# Patient Record
Sex: MALE | Race: WHITE | NOT HISPANIC OR LATINO | Employment: OTHER | URBAN - METROPOLITAN AREA
[De-identification: names, ages, dates, MRNs, and addresses within clinical notes are randomized per-mention and may not be internally consistent; named-entity substitution may affect disease eponyms.]

---

## 2024-03-09 PROBLEM — H61.23 BILATERAL IMPACTED CERUMEN: Status: ACTIVE | Noted: 2024-03-09

## 2024-03-09 PROBLEM — H90.3 SENSORINEURAL HEARING LOSS (SNHL) OF BOTH EARS: Status: ACTIVE | Noted: 2024-03-09

## 2024-03-09 PROBLEM — H61.303 STENOSIS OF BOTH EXTERNAL AUDITORY CANALS: Status: ACTIVE | Noted: 2024-03-09

## 2024-03-09 NOTE — PROGRESS NOTES
Subjective   Patient ID: Obdulio Pineda is a 68 y.o. male who presents for No chief complaint on file..  HPI  This 68-year-old male is being seen back in the office for a recheck primarily of his ears.  He was last seen in September 2023.  He has a history of ear canal stenosis along with ceruminous and buildup causing difficulties with hearing.  There is an underlying sensorineural hearing loss noted in both ears as well.  No active problems regarding pain or discharge from the ears are noted.  He has no other head neck related symptoms or problems at this time.       Review of Systems  A 12 point ROS has been reviewed and are negative for complaint except what is stated in the history of present illness and/or past medical history as noted in the EMR    Past Medical History:   Diagnosis Date    Personal history of malignant neoplasm of prostate     History of malignant neoplasm of prostate    Personal history of other diseases of the circulatory system     History of hypertension    Personal history of other diseases of the digestive system     History of esophageal reflux    Personal history of other diseases of urinary system     History of kidney disease    Personal history of other malignant neoplasm of kidney     History of malignant neoplasm of kidney    Unspecified convulsions (CMS/HCC)     Seizures        No current outpatient medications on file.   Objective   Physical Exam  EXAMINATION:     GENERAL LINDA.EARANCE: Alert, obese male in no acute distress, normal pitch and clarity of voice, cooperative.     HEAD/FACE: Normocephalic, atraumatic, normal facial movements and strength, no no tenderness to palpation, no lesions noted.     SKIN: Normal turgor, no raised or ulcerative lesions, warm and dry to palpation.     EYES: Extraocular motions intact, no nystagmus noted, pupils equal and reactive to light and accommodation, no conjunctivitis.     EARS: Both ears--both external ears are within normal limits  with stenosis of the canals noted.     NOSE: No external skin lesions are noted, nares are patent, septum is intact, sinuses are nontender to palpation bilaterally, no intranasal lesions or inflammation is noted, nasal valve is normal.     OROPHARYNX/ORAL CAVITY: Oropharynx is not inflamed and is without lesions, mucosa of the oral cavity is intact and without lesions, tongue is midline and mobile, no acute dental disease is noted, TMJs are mobile     NECK: No lymphadenopathy is palpated, carotid pulses are intact, neck is supple with full range of motion, no thyroid abnormalities are noted, trachea is midline, no neck masses are palpated.     LYMPHATICS: No cervical adenopathy or supraclavicular adenopathy is palpated.     NEUROLOGIC/PSYCH; alert and oriented, cranial nerves are grossly intact, gait is without falling, no motor deficits are noted.    Patient ID: Obdulio Pineda is a 68 y.o. male.    Ear cerumen removal    Date/Time: 3/13/2024 9:08 AM    Performed by: Nathan Olmos DMD, MD  Authorized by: Nathan Olmos DMD, MD    Consent:     Consent obtained:  Verbal    Consent given by:  Patient    Risks discussed:  Pain and incomplete removal    Alternatives discussed:  No treatment and alternative treatment  Universal protocol:     Procedure explained and questions answered to patient or proxy's satisfaction: yes      Imaging studies available: no      Required blood products, implants, devices, and special equipment available: no      Patient identity confirmed:  Verbally with patient  Procedure details:     Location:  L ear and R ear    Procedure type: curette      Procedure type comment:  Or suction    Procedure outcomes: cerumen removed    Post-procedure details:     Inspection:  No bleeding and ear canal clear    Hearing quality:  Improved    Procedure completion:  Tolerated well, no immediate complications  Comments:      CERUMEN REMOVAL    Consent:   the planned procedure is discussed  including possible risks, benefits and alternative treatments reviewed. Verbal consent is obtained.    Indications:  obstructive cerumen is noted affecting hearing and causing discomfort.    Procedure: The ears are examined microscopically and obstructive cerumen is removed with a wax loop,  and forceps.    Findings: Cerumen and epithelial debris obstructs both canals left much greater than right.  tympanic membranes are intact and noninflamed once visualized and no infections are noted.  The cerumen was removed with a combination of curette and forceps.  Ear canals are stenotic.    Post procedure: The patient tolerated the procedure well without complications.      His audiogram today in both ears continues to show a mild to moderate sensorineural hearing loss with very mild asymmetry in the high tone area.  Discrimination scores are 100% bilaterally at 70 dB.  Tympanograms are normal.  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 Do to the history of cerumen impactions, avoidance of Q tip use and water contamination is advised. Periodic check ups in the office or with the PCP are advised and if recurrent obstructions are noted a scheduled cleaning schedule will be maintained. Ear pain, otorhea, changes in hearing should be reported to the office. For some the use of debrox or baby oil can be helpful as long as wayne TM is intact and not perforated.    I discussed the present hearing test findings with the patient. Since the last test there has been no significant change in the hearing of individual frequencies sound. Discrimination ability remains basically unchanged. It would be advised that a  yearly audiogram be done unless symptoms develop in regards to progressive loss, new onset vertigo, or changes regarding tinnitus. Avoidance of loud noise without ear protection is advised. Rehabilitation using hearing aids is advised.    I discussed the physical findings with the patient. The ear canal stenosis does account for the accumulation of cerumen and epithelial debris in the outer third of the ear canal and obstructs the canal causing a decrease in hearing of a conductive nature. Periodic examinations and debridement are needed in order to maximize hearing and decrease risks of canal obstruction and possible postobstructive infections. Avoidance of water in the ears during bathing, avoidance of Q-tip use and periodic check ups for ear debridement are recommended.       Nathan Olmos DMD, MD 03/09/24 4:18 PM

## 2024-03-13 ENCOUNTER — CLINICAL SUPPORT (OUTPATIENT)
Dept: AUDIOLOGY | Facility: CLINIC | Age: 69
End: 2024-03-13
Payer: MEDICARE

## 2024-03-13 ENCOUNTER — OFFICE VISIT (OUTPATIENT)
Dept: OTOLARYNGOLOGY | Facility: CLINIC | Age: 69
End: 2024-03-13
Payer: MEDICARE

## 2024-03-13 DIAGNOSIS — H90.3 SENSORINEURAL HEARING LOSS (SNHL) OF BOTH EARS: ICD-10-CM

## 2024-03-13 DIAGNOSIS — H61.303 STENOSIS OF BOTH EXTERNAL AUDITORY CANALS: Primary | ICD-10-CM

## 2024-03-13 DIAGNOSIS — H61.23 BILATERAL IMPACTED CERUMEN: ICD-10-CM

## 2024-03-13 DIAGNOSIS — H93.13 TINNITUS OF BOTH EARS: ICD-10-CM

## 2024-03-13 DIAGNOSIS — H90.3 SENSORINEURAL HEARING LOSS (SNHL) OF BOTH EARS: Primary | ICD-10-CM

## 2024-03-13 PROCEDURE — 69210 REMOVE IMPACTED EAR WAX UNI: CPT | Performed by: OTOLARYNGOLOGY

## 2024-03-13 PROCEDURE — 1160F RVW MEDS BY RX/DR IN RCRD: CPT | Performed by: OTOLARYNGOLOGY

## 2024-03-13 PROCEDURE — 92557 COMPREHENSIVE HEARING TEST: CPT | Performed by: AUDIOLOGIST

## 2024-03-13 PROCEDURE — 1159F MED LIST DOCD IN RCRD: CPT | Performed by: OTOLARYNGOLOGY

## 2024-03-13 PROCEDURE — 92567 TYMPANOMETRY: CPT | Performed by: AUDIOLOGIST

## 2024-03-13 PROCEDURE — 99214 OFFICE O/P EST MOD 30 MIN: CPT | Performed by: OTOLARYNGOLOGY

## 2024-03-13 NOTE — PROGRESS NOTES
"COMPREHENSIVE AUDIOMETRIC EVALUATION      Name:  Obdulio Pineda \"Jeff\"  :  1955  Age:  68 y.o.  Date of Evaluation:  24   Referring Provider:  Dr. Olmos     History:  Mr. Pineda was seen today for an evaluation of hearing.  Patient reported tinnitus and concerns for decreased hearing sensitivity.  Please recall, patient has a known bilateral sensorineural hearing loss, most recently evaluated 10/13/2022, for which he has hearing aids.  Patient reported that he has not been consistently utilizing his hearing aids.  He noted increased difficulty in the presence of background noise. When asked, patient denied otalgia.    See audiometric evaluation at end of this report or scanned under media tab    OTOSCOPY:       Right Ear: Minimal non-occluding cerumen with unremarkable tympanic membrane       Left Ear: Significant though non-occluding cerumen with inability to visualize TM    226 Hz TYMPANOMETRY:       Right Ear: Type Ad: hypercompliant with normal peak pressure and ear canal volume       Left Ear: Type Ad: hypercompliant with normal peak pressure and ear canal volume    AUDIOMETRIC EVALUATION (Inserts):       Right Ear: Normal sloping to Moderate, Sensorineural hearing loss                 Left Ear: Normal sloping to Moderately severe, Sensorineural hearing loss    NOTE: Hearing sensitivity consistent with 10/13/2022 evaluation           Test technique:  Standard Audiometry  Reliability:   good    SPEECH RECOGNITION THRESHOLD:       Right Ear:  30 dBHL in good agreement with PTA       Left Ear:  30 dBHL in good agreement with PTA    WORD RECOGNITION:       Right Ear:  excellent (100%) at elevated presentation level       Left Ear:  excellent (100%) at elevated presentation level    DISCUSSION:   Discussed results and recommendations with patient.  Questions were addressed and the patient was encouraged to contact our department should concerns arise.    RECOMMENDATIONS:  -Recommend patient " re-establish consistent utilization of hearing aids  -Recommend patient return should concerns for changes in hearing sensitivity arise or as medically indicated.     Thanh Esquivel, CCC-A     Appt: 9::00 - 9:30 AM

## 2024-08-31 NOTE — PROGRESS NOTES
"Subjective   Patient ID: Obdulio Pineda \"Jeff\" is a 68 y.o. male who presents for No chief complaint on file..  HPI  This 68-year-old male last seen in the office in March is being seen in follow-up.  He has a history of ear canal stenosis causing cerumen buildup affecting hearing.  He does have a sensorineural hearing loss in both ears unassociated with symptoms of vertigo or instability.  Review of Systems  A 12 point ROS has been reviewed and are negative for complaint except what is stated in the history of present illness and/or past medical history as noted in the EMR  Active Ambulatory Problems     Diagnosis Date Noted    Stenosis of both external auditory canals 03/09/2024    Bilateral impacted cerumen 03/09/2024    Sensorineural hearing loss (SNHL) of both ears 03/09/2024    Tinnitus of both ears 03/13/2024     Resolved Ambulatory Problems     Diagnosis Date Noted    No Resolved Ambulatory Problems     Past Medical History:   Diagnosis Date    Personal history of malignant neoplasm of prostate     Personal history of other diseases of the circulatory system     Personal history of other diseases of the digestive system     Personal history of other diseases of urinary system     Personal history of other malignant neoplasm of kidney     Unspecified convulsions (Multi)        No current outpatient medications on file.     Social History     Tobacco Use    Smoking status: Not on file    Smokeless tobacco: Not on file   Substance Use Topics    Alcohol use: Not on file       Not on File    There were no vitals taken for this visit.    Objective   Physical Exam  GENERAL LINDA.EARANCE: Alert, obese male in no acute distress, normal pitch and clarity of voice, cooperative.     HEAD/FACE: Normocephalic, atraumatic, normal facial movements and strength, no no tenderness to palpation, no lesions noted.     SKIN: Normal turgor, no raised or ulcerative lesions, warm and dry to palpation.     EYES: Extraocular " "motions intact, no nystagmus noted, pupils equal and reactive to light and accommodation, no conjunctivitis.     EARS: Both ears--both external ears are within normal limits with stenosis of the canals noted.  See procedure note     NOSE: No external skin lesions are noted, nares are patent, septum is intact, sinuses are nontender to palpation bilaterally, no intranasal lesions or inflammation is noted, nasal valve is normal.     OROPHARYNX/ORAL CAVITY: Oropharynx is not inflamed and is without lesions, mucosa of the oral cavity is intact and without lesions, tongue is midline and mobile, no acute dental disease is noted, TMJs are mobile     NECK: No lymphadenopathy is palpated, carotid pulses are intact, neck is supple with full range of motion, no thyroid abnormalities are noted, trachea is midline, no neck masses are palpated.     LYMPHATICS: No cervical adenopathy or supraclavicular adenopathy is palpated.     NEUROLOGIC/PSYCH; alert and oriented, cranial nerves are grossly intact, gait is without falling, no motor deficits are noted.    Patient ID: Obdulio Pineda \"Albino" is a 68 y.o. male.    Ear cerumen removal    Date/Time: 9/23/2024 2:01 PM    Performed by: Nathan Olmos DMD, MD  Authorized by: Nathan Olmos DMD, MD    Consent:     Consent obtained:  Verbal    Consent given by:  Patient    Risks discussed:  Pain and incomplete removal    Alternatives discussed:  No treatment and alternative treatment  Universal protocol:     Procedure explained and questions answered to patient or proxy's satisfaction: yes      Imaging studies available: no      Required blood products, implants, devices, and special equipment available: no      Patient identity confirmed:  Verbally with patient  Procedure details:     Location:  L ear and R ear    Procedure type: curette      Procedure type comment:  Or suction    Procedure outcomes: cerumen removed    Post-procedure details:     Inspection:  No bleeding and " ear canal clear    Hearing quality:  Improved    Procedure completion:  Tolerated well, no immediate complications    Assessment/Plan   Problem List Items Addressed This Visit             ICD-10-CM    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  Exam today was negative for any signs of acute inflammation and has had no historical issues regarding changes in hearing or difficulties in other regards of his head neck.  Recheck in 6 months is recommended along with an audiogram to see if there is any further change in his overall hearing capacity.  He has been aware that he can improve his hearing by use of hearing aids if he is noticing more difficulties.  I encouraged him to stay well-hydrated avoid excessive salt in the diet and always contact the office with is any sudden change in hearing ability.  Nathan Olmos DMD, MD 08/31/24 12:07 PM

## 2024-09-05 PROBLEM — K63.1 COLON PERFORATION (MULTI): Status: ACTIVE | Noted: 2019-06-05

## 2024-09-05 PROBLEM — Z99.89 DEPENDENCE ON OTHER ENABLING MACHINES AND DEVICES: Status: ACTIVE | Noted: 2024-09-05

## 2024-09-05 PROBLEM — R56.9 SEIZURE (MULTI): Status: ACTIVE | Noted: 2024-09-05

## 2024-09-05 PROBLEM — Z86.79 HISTORY OF HYPERTENSION: Status: ACTIVE | Noted: 2024-09-05

## 2024-09-05 PROBLEM — E61.7 DEFICIENCY OF MULTIPLE NUTRIENT ELEMENTS: Status: ACTIVE | Noted: 2020-06-03

## 2024-09-05 PROBLEM — C61 PROSTATE CANCER (MULTI): Status: ACTIVE | Noted: 2017-10-09

## 2024-09-05 PROBLEM — G47.33 OBSTRUCTIVE SLEEP APNEA SYNDROME: Status: ACTIVE | Noted: 2019-06-05

## 2024-09-05 PROBLEM — K44.9 HIATAL HERNIA: Status: ACTIVE | Noted: 2020-06-22

## 2024-09-05 PROBLEM — K63.1 PERFORATED BOWEL (MULTI): Status: ACTIVE | Noted: 2019-06-12

## 2024-09-05 PROBLEM — D50.8 IRON DEFICIENCY ANEMIA SECONDARY TO INADEQUATE DIETARY IRON INTAKE: Status: ACTIVE | Noted: 2023-07-10

## 2024-09-05 PROBLEM — H61.23 CERUMINOSIS, BILATERAL: Status: ACTIVE | Noted: 2024-09-05

## 2024-09-05 PROBLEM — E66.01 MORBID OBESITY (MULTI): Status: ACTIVE | Noted: 2020-06-22

## 2024-09-05 PROBLEM — H93.293 AUDITORY DISCRIMINATION IMPAIRMENT, BILATERAL: Status: ACTIVE | Noted: 2024-09-05

## 2024-09-05 RX ORDER — CLONIDINE HYDROCHLORIDE 0.1 MG/1
0.1 TABLET ORAL DAILY
COMMUNITY

## 2024-09-05 RX ORDER — UBIDECARENONE 75 MG
CAPSULE ORAL EVERY 24 HOURS
COMMUNITY

## 2024-09-05 RX ORDER — APIXABAN 5 MG/1
TABLET, FILM COATED ORAL
COMMUNITY

## 2024-09-05 RX ORDER — AMLODIPINE BESYLATE 5 MG/1
5 TABLET ORAL DAILY
COMMUNITY

## 2024-09-05 RX ORDER — DULAGLUTIDE 0.75 MG/.5ML
INJECTION, SOLUTION SUBCUTANEOUS
COMMUNITY
Start: 2023-07-10

## 2024-09-05 RX ORDER — PSEUDOEPHEDRINE HCL 30 MG
TABLET ORAL EVERY 12 HOURS
COMMUNITY

## 2024-09-05 RX ORDER — CHOLECALCIFEROL (VITAMIN D3) 50 MCG
TABLET ORAL
COMMUNITY
Start: 2018-03-16

## 2024-09-05 RX ORDER — TERAZOSIN 10 MG/1
CAPSULE ORAL
COMMUNITY
Start: 2017-01-13

## 2024-09-05 RX ORDER — METFORMIN HYDROCHLORIDE 500 MG/1
TABLET, EXTENDED RELEASE ORAL EVERY 24 HOURS
COMMUNITY
Start: 2023-12-07

## 2024-09-05 RX ORDER — ROSUVASTATIN CALCIUM 20 MG/1
20 TABLET, COATED ORAL DAILY
COMMUNITY

## 2024-09-05 RX ORDER — MULTIVIT-MIN/FOLIC/VIT K/LYCOP 400-300MCG
TABLET ORAL
COMMUNITY
Start: 2018-03-16

## 2024-09-05 RX ORDER — OMEPRAZOLE 20 MG/1
20 CAPSULE, DELAYED RELEASE ORAL DAILY
COMMUNITY

## 2024-09-05 RX ORDER — NIRMATRELVIR AND RITONAVIR 300-100 MG
KIT ORAL
COMMUNITY
Start: 2023-12-02

## 2024-09-05 RX ORDER — ALLOPURINOL 100 MG/1
TABLET ORAL EVERY 24 HOURS
COMMUNITY
Start: 2018-10-16

## 2024-09-05 RX ORDER — SILDENAFIL 100 MG/1
100 TABLET, FILM COATED ORAL
COMMUNITY
Start: 2021-01-11

## 2024-09-05 RX ORDER — OMEPRAZOLE 40 MG/1
CAPSULE, DELAYED RELEASE ORAL
COMMUNITY

## 2024-09-05 RX ORDER — LEVOTHYROXINE SODIUM 50 UG/1
50 TABLET ORAL DAILY
COMMUNITY

## 2024-09-05 RX ORDER — CARVEDILOL 25 MG/1
TABLET ORAL
COMMUNITY
Start: 2018-10-16

## 2024-09-16 ENCOUNTER — APPOINTMENT (OUTPATIENT)
Dept: OTOLARYNGOLOGY | Facility: CLINIC | Age: 69
End: 2024-09-16
Payer: MEDICARE

## 2024-09-23 ENCOUNTER — APPOINTMENT (OUTPATIENT)
Dept: OTOLARYNGOLOGY | Facility: CLINIC | Age: 69
End: 2024-09-23
Payer: MEDICARE

## 2024-09-23 VITALS — HEIGHT: 73 IN | BODY MASS INDEX: 33.13 KG/M2 | WEIGHT: 250 LBS

## 2024-09-23 DIAGNOSIS — H90.3 SENSORINEURAL HEARING LOSS (SNHL) OF BOTH EARS: ICD-10-CM

## 2024-09-23 DIAGNOSIS — H61.23 BILATERAL IMPACTED CERUMEN: ICD-10-CM

## 2024-09-23 DIAGNOSIS — H93.13 TINNITUS OF BOTH EARS: ICD-10-CM

## 2024-09-23 DIAGNOSIS — H61.303 STENOSIS OF BOTH EXTERNAL AUDITORY CANALS: Primary | ICD-10-CM

## 2024-09-23 PROCEDURE — 3008F BODY MASS INDEX DOCD: CPT | Performed by: OTOLARYNGOLOGY

## 2024-09-23 PROCEDURE — 1160F RVW MEDS BY RX/DR IN RCRD: CPT | Performed by: OTOLARYNGOLOGY

## 2024-09-23 PROCEDURE — 99213 OFFICE O/P EST LOW 20 MIN: CPT | Performed by: OTOLARYNGOLOGY

## 2024-09-23 PROCEDURE — 1036F TOBACCO NON-USER: CPT | Performed by: OTOLARYNGOLOGY

## 2024-09-23 PROCEDURE — 69210 REMOVE IMPACTED EAR WAX UNI: CPT | Performed by: OTOLARYNGOLOGY

## 2024-09-23 PROCEDURE — 1159F MED LIST DOCD IN RCRD: CPT | Performed by: OTOLARYNGOLOGY

## 2025-03-14 NOTE — PROGRESS NOTES
"Subjective   Patient ID: Obdulio Pineda \"Jeff\" is a 69 y.o. male who presents for No chief complaint on file..  HPI  This 69-year-old male last seen in the office in September 2024 is being seen in follow-up.  He has a tendency for cerumen buildup complicated by ear canal stenosis.  Previous evaluations have been positive for sensorineural hearing loss as well and has had no difficulties with vertigo or instability.  He does have hearing aids that he obtained around the year 2020 however has not been wearing them and he states that the charge for the hearing aids does not work at this point.  He has not seen audiology for them to be repaired.  There is been no difficulties with vertigo, ear pain or drainage.  Review of Systems   A 12 point ROS  has been reviewed and are negative for complaint except for what is stated in the history of present illness and /or for past medical history as noted in the EMR.    Past Medical History:   Diagnosis Date    Personal history of malignant neoplasm of prostate     History of malignant neoplasm of prostate    Personal history of other diseases of the circulatory system     History of hypertension    Personal history of other diseases of the digestive system     History of esophageal reflux    Personal history of other diseases of urinary system     History of kidney disease    Personal history of other malignant neoplasm of kidney     History of malignant neoplasm of kidney    Unspecified convulsions (Multi)     Seizures          Current Outpatient Medications:     allopurinol (Zyloprim) 100 mg tablet, once every 24 hours., Disp: , Rfl:     amLODIPine (Norvasc) 5 mg tablet, Take 1 tablet (5 mg) by mouth once daily., Disp: , Rfl:     calcium citrate 250 mg calcium tablet, every 12 hours., Disp: , Rfl:     carvedilol (Coreg) 25 mg tablet, TAKE 1 TABLET BY MOUTH TWICE A DAY for 90, Disp: , Rfl:     cholecalciferol (Vitamin D-3) 50 MCG (2000 UT) tablet, Take by mouth., Disp: , " Rfl:     cloNIDine (Catapres) 0.1 mg tablet, Take 1 tablet (0.1 mg) by mouth once daily., Disp: , Rfl:     cyanocobalamin (Vitamin B-12) 500 mcg tablet, once every 24 hours., Disp: , Rfl:     Eliquis 5 mg tablet, TAKE ONE TABLET BY MOUTH TWICE A DAY FOR 30 DAYS, Disp: , Rfl:     levothyroxine (Synthroid, Levoxyl) 50 mcg tablet, Take 1 tablet (50 mcg) by mouth once daily., Disp: , Rfl:     metFORMIN  mg 24 hr tablet, once every 24 hours., Disp: , Rfl:     multivit-min-folic-vit K-lycop (One-A-Day Men's Multivitamin) 400- mcg tablet, Take by mouth., Disp: , Rfl:     omeprazole (PriLOSEC) 20 mg DR capsule, Take 1 capsule (20 mg) by mouth once daily., Disp: , Rfl:     omeprazole (PriLOSEC) 40 mg DR capsule, 1 capsule, Disp: , Rfl:     Paxlovid 300 mg (150 mg x 2)-100 mg tablet therapy pack, USE AS DIRECTED ON PACKAGE FOR 5 DAYS, Disp: , Rfl:     rosuvastatin (Crestor) 20 mg tablet, Take 1 tablet (20 mg) by mouth once daily., Disp: , Rfl:     sildenafil (Viagra) 100 mg tablet, Take 1 tablet (100 mg) by mouth., Disp: , Rfl:     terazosin (Hytrin) 10 mg capsule, 1 cap(s) orally once a day (at bedtime) for 90, Disp: , Rfl:     Trulicity 0.75 mg/0.5 mL pen injector, 0.5 mL Subcutaneous once a week for 56 days, Disp: , Rfl:      Allergies   Allergen Reactions    Ace Inhibitors Other and Swelling     Tongue swelling. Angioedema       There were no vitals taken for this visit.    Objective   Physical Exam  GENERAL LINDA.EARANCE: Alert, obese male in no acute distress, normal pitch and clarity of voice, cooperative.     HEAD/FACE: Normocephalic, atraumatic, normal facial movements and strength, no no tenderness to palpation, no lesions noted.     SKIN: Normal turgor, no raised or ulcerative lesions, warm and dry to palpation.     EYES: Extraocular motions intact, no nystagmus noted, pupils equal and reactive to light and accommodation, no conjunctivitis.     EARS: Both ears--both external ears are within normal limits  "with stenosis of the canals noted.  See procedure note     NOSE: No external skin lesions are noted, nares are patent, septum is intact, sinuses are nontender to palpation bilaterally, no intranasal lesions or inflammation is noted, nasal valve is normal.     OROPHARYNX/ORAL CAVITY: Oropharynx is not inflamed and is without lesions, mucosa of the oral cavity is intact and without lesions, tongue is midline and mobile, no acute dental disease is noted, TMJs are mobile     NECK: No lymphadenopathy is palpated, carotid pulses are intact, neck is supple with full range of motion, no thyroid abnormalities are noted, trachea is midline, no neck masses are palpated.     LYMPHATICS: No cervical adenopathy or supraclavicular adenopathy is palpated.     NEUROLOGIC/PSYCH; alert and oriented, cranial nerves are grossly intact, gait is without falling, no motor deficits are noted.    Patient ID: Obdulio Pineda \"Albino" is a 69 y.o. male.    Ear cerumen removal    Date/Time: 3/18/2025 3:19 PM    Performed by: Nathan Olmos DMD, MD  Authorized by: Nathan Olmos DMD, MD    Consent:     Consent obtained:  Verbal    Consent given by:  Patient    Risks discussed:  Pain and incomplete removal    Alternatives discussed:  No treatment and alternative treatment  Universal protocol:     Procedure explained and questions answered to patient or proxy's satisfaction: yes      Imaging studies available: no      Required blood products, implants, devices, and special equipment available: no      Patient identity confirmed:  Verbally with patient  Procedure details:     Location:  L ear and R ear    Procedure type: curette      Procedure type comment:  Or suction    Procedure outcomes: cerumen removed    Post-procedure details:     Inspection:  No bleeding and ear canal clear    Hearing quality:  Improved    Procedure completion:  Tolerated well, no immediate complications      His audiogram today continues to show low normal hearing " up through 1000 Hz then a mild to moderate hearing loss is noted on the right ear from 1500 Hz to 8000 Hz moderate to moderately severe in the left ear with asymmetry still noted in the upper frequencies of sound.  There is a slight decrease in the left ear at 3000 Hz and at 6 to 8000 Hz and on the right ear at 6000 Hz.  Tympanograms were normal.  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  6-month follow-up is recommended due to the cerumen buildup in the narrowness of the ear canals at times adding further difficulties to hearing.  He knows to keep water out of his years and avoid Q-tip use and to lubricate the skin around the ear canal opening with oil drops.  I did make him aware that there is some slight increase in the high-frequency hearing loss compared to past test.  The asymmetry is not enough at this point to warrant rechecking retrocochlear issues.  He would benefit from wearing his hearing aids and I did recommend he make a appointment with audiology to see if they can be serviced.  6-month recheck was otherwise advised.       Nathan Olmos DMD, MD 03/14/25 9:16 AM

## 2025-03-18 ENCOUNTER — APPOINTMENT (OUTPATIENT)
Dept: OTOLARYNGOLOGY | Facility: CLINIC | Age: 70
End: 2025-03-18
Payer: MEDICARE

## 2025-03-18 ENCOUNTER — APPOINTMENT (OUTPATIENT)
Dept: AUDIOLOGY | Facility: CLINIC | Age: 70
End: 2025-03-18
Payer: MEDICARE

## 2025-03-18 VITALS
WEIGHT: 250 LBS | SYSTOLIC BLOOD PRESSURE: 154 MMHG | DIASTOLIC BLOOD PRESSURE: 87 MMHG | BODY MASS INDEX: 32.98 KG/M2 | HEART RATE: 49 BPM

## 2025-03-18 DIAGNOSIS — H93.13 TINNITUS OF BOTH EARS: ICD-10-CM

## 2025-03-18 DIAGNOSIS — H90.3 SENSORINEURAL HEARING LOSS (SNHL) OF BOTH EARS: Primary | ICD-10-CM

## 2025-03-18 DIAGNOSIS — H61.303 STENOSIS OF BOTH EXTERNAL AUDITORY CANALS: Primary | ICD-10-CM

## 2025-03-18 DIAGNOSIS — H90.3 SENSORINEURAL HEARING LOSS (SNHL) OF BOTH EARS: ICD-10-CM

## 2025-03-18 DIAGNOSIS — H61.23 BILATERAL IMPACTED CERUMEN: ICD-10-CM

## 2025-03-18 PROCEDURE — 3077F SYST BP >= 140 MM HG: CPT | Performed by: OTOLARYNGOLOGY

## 2025-03-18 PROCEDURE — 3079F DIAST BP 80-89 MM HG: CPT | Performed by: OTOLARYNGOLOGY

## 2025-03-18 PROCEDURE — 1160F RVW MEDS BY RX/DR IN RCRD: CPT | Performed by: OTOLARYNGOLOGY

## 2025-03-18 PROCEDURE — 92557 COMPREHENSIVE HEARING TEST: CPT | Performed by: AUDIOLOGIST

## 2025-03-18 PROCEDURE — 1159F MED LIST DOCD IN RCRD: CPT | Performed by: OTOLARYNGOLOGY

## 2025-03-18 PROCEDURE — 99214 OFFICE O/P EST MOD 30 MIN: CPT | Performed by: OTOLARYNGOLOGY

## 2025-03-18 PROCEDURE — 1036F TOBACCO NON-USER: CPT | Performed by: OTOLARYNGOLOGY

## 2025-03-18 PROCEDURE — 69210 REMOVE IMPACTED EAR WAX UNI: CPT | Performed by: OTOLARYNGOLOGY

## 2025-03-18 PROCEDURE — 92567 TYMPANOMETRY: CPT | Performed by: AUDIOLOGIST

## 2025-03-18 NOTE — PROGRESS NOTES
"COMPREHENSIVE AUDIOMETRIC EVALUATION      Name:  Obdulio Pineda \"Jeff\"  :  1955  Age:  69 y.o.  Date of Evaluation:  25   Referring Provider:  Nathan Olmos DMD, MD     History:  Mr. Pineda was seen today for an evaluation of hearing. Please recall, patient has known asymmetric hearing loss (L>R) and has hearing aids, though discontinued use during Covid-19 due to inconvenience of the hearing aids with masks.  Patient reported longstanding bilateral tinnitus and concerns for decreased hearing sensitivity.  When asked, patient denied otalgia, aural fullness, otorrhea, and dizziness    See audiometric evaluation at end of this report or scanned under media tab    OTOSCOPY:       Right Ear: Moderate though non-occluding cerumen       Left Ear: Moderate though non-occluding cerumen    226 Hz TYMPANOMETRY:       Right Ear: Type Ad: hypercompliant with normal peak pressure and ear canal volume       Left Ear: Type Ad: hypercompliant with normal peak pressure and ear canal volume    AUDIOMETRIC EVALUATION (Phones):       Right Ear: Normal through 500 Hz sloping to Moderately severe, Sensorineural hearing loss                 Left Ear: Normal through 500 Hz sloping to Severe, Sensorineural hearing loss           NOTE: Hearing sensitivity  decreased in the right ear at 6000 Hz and the left ear at 3000 and 5443-9486 Hz as compared to most recent audiometric evaluation 3/13/2024  NOTE: Clinically significant asymmetry (L>R) consistent with previous audiometric evaluations.    Test technique:  Standard Audiometry  Reliability:   good    SPEECH RECOGNITION THRESHOLD:       Right Ear:  20 dBHL in good agreement with Gomez's average PTA       Left Ear:  20 dBHL in good agreement with Gomez's averagePTA    WORD RECOGNITION:       Right Ear:  90%  excellent (%) at elevated presentation level       Left Ear:   90%  excellent (%) at elevated presentation level    DISCUSSION:   Discussed results " and recommendations with patient.  Questions were addressed and the patient was encouraged to contact our department should concerns arise.    RECOMMENDATIONS:  -Further investigation to rule out retrocochlear inolvement due to clinically significant high frequency asymmetry may be warranted.  -Recommend patient re-establish consistent utilization of hearing aids and follow up with audiologist Thanh Albert, CCC-A  -Recommend patient return for repeated audiometric evaluation should concerns for changes in hearing sensitivity arise or as medically indicated.  -Follow up with Nathan Olmos DMD, MD as scheduled.     LARISSA Lanza Audiology Student     Thanh Esquivel, CCC-A     Appt: 2:00 - 2:30 PM

## 2025-05-05 ENCOUNTER — APPOINTMENT (OUTPATIENT)
Dept: AUDIOLOGY | Facility: CLINIC | Age: 70
End: 2025-05-05
Payer: MEDICARE

## 2025-05-05 DIAGNOSIS — H90.3 SENSORINEURAL HEARING LOSS (SNHL) OF BOTH EARS: Primary | ICD-10-CM

## 2025-05-05 PROCEDURE — V5267 HEARING AID SUP/ACCESS/DEV: HCPCS | Performed by: AUDIOLOGIST

## 2025-05-05 NOTE — PROGRESS NOTES
[facility] AUDIOLOGY  HEARING AID CHECK      Name:  Obdulio Pineda  YOB: 1955  Today's date:  05/05/25      PATIENT ISSUES/CONCERNS AND OTOSCOPIC RESULTS  Otoscopic was clear.  Patient reports that he has not worn the hearing aids at all recently but he would like to start using them again.    HEARING AID INSPECTION AND ADJUSTMENT  Hearing aids were cleaned and checked.  Replaced filters and cleaned vents.  Listening check was good.  Analyzer data shows minimal overall use.      Updated the Firmware.  Programmed to the most recent audiogram from March.  Encouraged more consistent use.    Attempted to download the [employer] roma for the patient to track battery life since he feels it has been poor (cannot confirm in the office).  Patient does not know his Apple password so we were unable to download the roma today.  Gave written instructions on the name of the Roma if interested.    Quoted for repair versus replace if rechargeable batteries are malfunctioning due to age (hearing aids are more than 5 years old).    FOLLOW UP   The patient was asked to contact the office if they notice any significant change in hearing level or hearing aid function.    Otherwise, will follow up again:  6 month - 30 minute Hearing Aid Check with Radha    CHARGES  $25.00 office visit, simple ()    APPOINTMENT TIME  3:30pm-4:00pm    Thanh Albert  Doctor of Audiology  Senior Audiologist

## 2025-09-18 ENCOUNTER — APPOINTMENT (OUTPATIENT)
Dept: OTOLARYNGOLOGY | Facility: CLINIC | Age: 70
End: 2025-09-18
Payer: MEDICARE